# Patient Record
Sex: MALE | Race: OTHER | HISPANIC OR LATINO | URBAN - METROPOLITAN AREA
[De-identification: names, ages, dates, MRNs, and addresses within clinical notes are randomized per-mention and may not be internally consistent; named-entity substitution may affect disease eponyms.]

---

## 2020-11-09 ENCOUNTER — EMERGENCY (EMERGENCY)
Facility: HOSPITAL | Age: 40
LOS: 1 days | Discharge: HOME | End: 2020-11-09
Attending: EMERGENCY MEDICINE
Payer: MEDICARE

## 2020-11-09 VITALS
HEIGHT: 67 IN | RESPIRATION RATE: 18 BRPM | TEMPERATURE: 99 F | DIASTOLIC BLOOD PRESSURE: 63 MMHG | HEART RATE: 123 BPM | SYSTOLIC BLOOD PRESSURE: 107 MMHG | WEIGHT: 220.02 LBS | OXYGEN SATURATION: 91 %

## 2020-11-09 LAB
ALBUMIN SERPL ELPH-MCNC: 4.2 G/DL — SIGNIFICANT CHANGE UP (ref 3.3–5)
ALP SERPL-CCNC: 65 U/L — SIGNIFICANT CHANGE UP (ref 40–120)
ALT FLD-CCNC: 34 U/L — SIGNIFICANT CHANGE UP (ref 10–45)
ANION GAP SERPL CALC-SCNC: 14 MMOL/L — SIGNIFICANT CHANGE UP (ref 5–17)
APTT BLD: 32.8 SEC — SIGNIFICANT CHANGE UP (ref 27.5–35.5)
AST SERPL-CCNC: 30 U/L — SIGNIFICANT CHANGE UP (ref 10–40)
BASOPHILS # BLD AUTO: 0.05 K/UL — SIGNIFICANT CHANGE UP (ref 0–0.2)
BASOPHILS NFR BLD AUTO: 0.8 % — SIGNIFICANT CHANGE UP (ref 0–2)
BILIRUB SERPL-MCNC: 0.2 MG/DL — SIGNIFICANT CHANGE UP (ref 0.2–1.2)
BUN SERPL-MCNC: 7 MG/DL — SIGNIFICANT CHANGE UP (ref 7–23)
CALCIUM SERPL-MCNC: 8.7 MG/DL — SIGNIFICANT CHANGE UP (ref 8.4–10.5)
CHLORIDE SERPL-SCNC: 106 MMOL/L — SIGNIFICANT CHANGE UP (ref 96–108)
CO2 SERPL-SCNC: 27 MMOL/L — SIGNIFICANT CHANGE UP (ref 22–31)
CREAT SERPL-MCNC: 0.75 MG/DL — SIGNIFICANT CHANGE UP (ref 0.5–1.3)
EOSINOPHIL # BLD AUTO: 0.09 K/UL — SIGNIFICANT CHANGE UP (ref 0–0.5)
EOSINOPHIL NFR BLD AUTO: 1.5 % — SIGNIFICANT CHANGE UP (ref 0–6)
ETHANOL SERPL-MCNC: 368 MG/DL — HIGH (ref 0–10)
GLUCOSE SERPL-MCNC: 107 MG/DL — HIGH (ref 70–99)
HCT VFR BLD CALC: 40.5 % — SIGNIFICANT CHANGE UP (ref 39–50)
HGB BLD-MCNC: 14.4 G/DL — SIGNIFICANT CHANGE UP (ref 13–17)
IMM GRANULOCYTES NFR BLD AUTO: 0.2 % — SIGNIFICANT CHANGE UP (ref 0–1.5)
INR BLD: 0.98 RATIO — SIGNIFICANT CHANGE UP (ref 0.88–1.16)
LACTATE BLDV-MCNC: 2.5 MMOL/L — HIGH (ref 0.7–2)
LIDOCAIN IGE QN: 40 U/L — SIGNIFICANT CHANGE UP (ref 7–60)
LYMPHOCYTES # BLD AUTO: 2.45 K/UL — SIGNIFICANT CHANGE UP (ref 1–3.3)
LYMPHOCYTES # BLD AUTO: 39.9 % — SIGNIFICANT CHANGE UP (ref 13–44)
MCHC RBC-ENTMCNC: 32.7 PG — SIGNIFICANT CHANGE UP (ref 27–34)
MCHC RBC-ENTMCNC: 35.6 GM/DL — SIGNIFICANT CHANGE UP (ref 32–36)
MCV RBC AUTO: 92 FL — SIGNIFICANT CHANGE UP (ref 80–100)
MONOCYTES # BLD AUTO: 0.68 K/UL — SIGNIFICANT CHANGE UP (ref 0–0.9)
MONOCYTES NFR BLD AUTO: 11.1 % — SIGNIFICANT CHANGE UP (ref 2–14)
NEUTROPHILS # BLD AUTO: 2.86 K/UL — SIGNIFICANT CHANGE UP (ref 1.8–7.4)
NEUTROPHILS NFR BLD AUTO: 46.5 % — SIGNIFICANT CHANGE UP (ref 43–77)
NRBC # BLD: 0 /100 WBCS — SIGNIFICANT CHANGE UP (ref 0–0)
PLATELET # BLD AUTO: 338 K/UL — SIGNIFICANT CHANGE UP (ref 150–400)
POTASSIUM SERPL-MCNC: 3.7 MMOL/L — SIGNIFICANT CHANGE UP (ref 3.5–5.3)
POTASSIUM SERPL-SCNC: 3.7 MMOL/L — SIGNIFICANT CHANGE UP (ref 3.5–5.3)
PROT SERPL-MCNC: 7.8 G/DL — SIGNIFICANT CHANGE UP (ref 6–8.3)
PROTHROM AB SERPL-ACNC: 11.8 SEC — SIGNIFICANT CHANGE UP (ref 10.6–13.6)
RAPID RVP RESULT: SIGNIFICANT CHANGE UP
RBC # BLD: 4.4 M/UL — SIGNIFICANT CHANGE UP (ref 4.2–5.8)
RBC # FLD: 13.8 % — SIGNIFICANT CHANGE UP (ref 10.3–14.5)
SARS-COV-2 RNA SPEC QL NAA+PROBE: SIGNIFICANT CHANGE UP
SODIUM SERPL-SCNC: 147 MMOL/L — HIGH (ref 135–145)
WBC # BLD: 6.14 K/UL — SIGNIFICANT CHANGE UP (ref 3.8–10.5)
WBC # FLD AUTO: 6.14 K/UL — SIGNIFICANT CHANGE UP (ref 3.8–10.5)

## 2020-11-09 PROCEDURE — 99285 EMERGENCY DEPT VISIT HI MDM: CPT | Mod: 25

## 2020-11-09 PROCEDURE — 96374 THER/PROPH/DIAG INJ IV PUSH: CPT | Mod: XU

## 2020-11-09 PROCEDURE — 80053 COMPREHEN METABOLIC PANEL: CPT

## 2020-11-09 PROCEDURE — 71045 X-RAY EXAM CHEST 1 VIEW: CPT

## 2020-11-09 PROCEDURE — 83690 ASSAY OF LIPASE: CPT

## 2020-11-09 PROCEDURE — 80307 DRUG TEST PRSMV CHEM ANLYZR: CPT

## 2020-11-09 PROCEDURE — 0225U NFCT DS DNA&RNA 21 SARSCOV2: CPT

## 2020-11-09 PROCEDURE — 70450 CT HEAD/BRAIN W/O DYE: CPT | Mod: 26

## 2020-11-09 PROCEDURE — 85730 THROMBOPLASTIN TIME PARTIAL: CPT

## 2020-11-09 PROCEDURE — 83605 ASSAY OF LACTIC ACID: CPT

## 2020-11-09 PROCEDURE — 70450 CT HEAD/BRAIN W/O DYE: CPT

## 2020-11-09 PROCEDURE — 72170 X-RAY EXAM OF PELVIS: CPT | Mod: 26

## 2020-11-09 PROCEDURE — 96375 TX/PRO/DX INJ NEW DRUG ADDON: CPT | Mod: XU

## 2020-11-09 PROCEDURE — 82962 GLUCOSE BLOOD TEST: CPT

## 2020-11-09 PROCEDURE — 85025 COMPLETE CBC W/AUTO DIFF WBC: CPT

## 2020-11-09 PROCEDURE — 74177 CT ABD & PELVIS W/CONTRAST: CPT

## 2020-11-09 PROCEDURE — 74177 CT ABD & PELVIS W/CONTRAST: CPT | Mod: 26

## 2020-11-09 PROCEDURE — 71260 CT THORAX DX C+: CPT | Mod: 26

## 2020-11-09 PROCEDURE — 71045 X-RAY EXAM CHEST 1 VIEW: CPT | Mod: 26

## 2020-11-09 PROCEDURE — 99291 CRITICAL CARE FIRST HOUR: CPT

## 2020-11-09 PROCEDURE — 72125 CT NECK SPINE W/O DYE: CPT

## 2020-11-09 PROCEDURE — 72125 CT NECK SPINE W/O DYE: CPT | Mod: 26

## 2020-11-09 PROCEDURE — 71260 CT THORAX DX C+: CPT

## 2020-11-09 PROCEDURE — 72170 X-RAY EXAM OF PELVIS: CPT

## 2020-11-09 PROCEDURE — 85610 PROTHROMBIN TIME: CPT

## 2020-11-09 RX ORDER — SODIUM CHLORIDE 9 MG/ML
1000 INJECTION INTRAMUSCULAR; INTRAVENOUS; SUBCUTANEOUS ONCE
Refills: 0 | Status: COMPLETED | OUTPATIENT
Start: 2020-11-09 | End: 2020-11-09

## 2020-11-09 RX ORDER — SODIUM CHLORIDE 9 MG/ML
2000 INJECTION, SOLUTION INTRAVENOUS ONCE
Refills: 0 | Status: COMPLETED | OUTPATIENT
Start: 2020-11-09 | End: 2020-11-09

## 2020-11-09 RX ORDER — HALOPERIDOL DECANOATE 100 MG/ML
2.5 INJECTION INTRAMUSCULAR ONCE
Refills: 0 | Status: COMPLETED | OUTPATIENT
Start: 2020-11-09 | End: 2020-11-09

## 2020-11-09 RX ADMIN — SODIUM CHLORIDE 1000 MILLILITER(S): 9 INJECTION INTRAMUSCULAR; INTRAVENOUS; SUBCUTANEOUS at 19:53

## 2020-11-09 RX ADMIN — SODIUM CHLORIDE 2000 MILLILITER(S): 9 INJECTION, SOLUTION INTRAVENOUS at 23:03

## 2020-11-09 RX ADMIN — HALOPERIDOL DECANOATE 2.5 MILLIGRAM(S): 100 INJECTION INTRAMUSCULAR at 16:44

## 2020-11-09 RX ADMIN — Medication 2 MILLIGRAM(S): at 16:07

## 2020-11-09 NOTE — ED ADULT TRIAGE NOTE - CHIEF COMPLAINT QUOTE
ETOH- [place] [facility] [place] abrasion to right side of head  diaphoretic, hypoxic, tachycardic on arrival

## 2020-11-09 NOTE — ED PROVIDER NOTE - NSFOLLOWUPINSTRUCTIONS_ED_ALL_ED_FT
Rest, drink plenty of fluids  Advance activity as tolerated  Continue all previously prescribed medications as directed  Follow up with your PMD - bring copies of your results  Return to the ER for chest pain, difficulty breathing, numbness, weakness, or other new or concerning symptoms   For pain, you may take ibuprofen 600mg every 6 hours as needed

## 2020-11-09 NOTE — ED ADULT NURSE NOTE - ED STAT RN HANDOFF DETAILS
Bedside report given to on coming nurse Jaylin Understands patient condition , medications given and plan of care for patient. Patient in stable condition, vital signs updated, has no complaints at this time and has been updated on care plan. Explained to patient that it is change of shift and new nurse is taking over, pt verbalized understanding.

## 2020-11-09 NOTE — ED PROVIDER NOTE - PATIENT PORTAL LINK FT
You can access the FollowMyHealth Patient Portal offered by [facility] by registering at the following website: [email or web address]. By joining [employer]’s FollowMyHealth portal, you will also be able to view your health information using other applications (apps) compatible with our system.

## 2020-11-09 NOTE — ED ADULT NURSE NOTE - OBJECTIVE STATEMENT
39y/o male arrives to the ER via EMS  complaining of [place], kimmie BHATIA . Pt is A&Ox4, speaking coherently. PMH ....Pt states. ....On assessment airway is patent, breathing spontaneusly and unlabored, skin is dry, warm and color appropiate to race. adomen is soft, non distended, non tender, ROM in all extremities. Pt denies SOB, chest pain, NVD, fevers, chills. Comfort measures provided. call bell within reach, bed locked in the lowest position. will continure to reasess . 41y/o male arrives to the ER via EMS after [place], possible fall. Pt is ETHOT, hypoxic, tachycardic,  . Pt is A&Ox2, speaking coherently, but uncooperative. pt was trying to pull the IV lines, nasal cannula.  abrasion on the right side of the cheek noted. On assessment airway is patent, breathing spontaneously and unlabored, skin is dry, warm and color appropriate to race. abdomen is soft, non distended, non tender, ROM in all extremities. Pt denies SOB, chest pain, NVD, fevers, chills. Comfort measures provided. call bell within reach, bed locked in the lowest position. will continue to reassess . 41y/o male arrives to the ER via EMS after [place], possible fall. Pt is ETHOT, hypoxic, tachycardic,  . Pt is A&Ox2, speaking coherently, but uncooperative. pt was trying to pull the IV lines, nasal cannula.  abrasion on the right side of the cheek noted. Pt placed on continuous pulse ox and cardiac monitor, sinus tach noted. Upon assessment, pt was at uncooperative, anxious, and risk of harm himself, therefore constant observation initiated, via one to one at bedside for patient safety  On assessment airway is patent, breathing spontaneously and unlabored, skin is dry, warm and color appropriate to race. abdomen is soft, non distended, non tender, ROM in all extremities. Pt denies SOB, chest pain, NVD, fevers, chills. Comfort measures provided. call bell within reach, bed locked in the lowest position. will continue to reassess . 41y/o male arrives to the ER via EMS after being [place] the train station intoxicated. Pt is A&Ox2, speaking coherently, but uncooperative with staff. According to EMS pt was hypoxic to 90% RA, refusing to keep NC in nose or mask over face. Pt has an abrasion on the right side of the cheek noted, no other s/s trauma noted. no obvious deformities noted. Pt placed on continuous pulse ox and cardiac monitor, sinus tach noted. pt was at uncooperative, anxious, and risk of elopement, therefore constant observation initiated, via one to one at bedside for patient safety. On assessment airway is patent, breathing spontaneously and unlabored, skin is dry, warm and color appropriate to race. abdomen is soft, non distended, non tender, ROM in all extremities. Pt denies SOB, chest pain, NVD, fevers, chills. Comfort measures provided. call bell within reach, bed locked in the lowest position. will continue to reassess.

## 2020-11-09 NOTE — ED PROVIDER NOTE - PROGRESS NOTE DETAILS
Joseph Frankel PGY2: Scans negative for trauma aside from chronic injuries. CT chest with bl atelectasis which may be causing his hypoxia and compensatory tachycardia. Patient signed out to MD Brown with gusman to reassess patient and vitals once clinically sober. pt has been stable. take off 1:1 and cont to observe. Kevin: Patient reassessed.  Clinically sober, ambulatory.  Denies pain, states feels well otherwise.  Discussed results with patient and instructions to follow up with primary care physician.  Will dc with return precautions.

## 2020-11-09 NOTE — ED PROVIDER NOTE - PHYSICAL EXAMINATION
Gen: Combative and agitated. Not answering questions appropriately  HEENT: + abrasion on right side of forehead, extra occular movements intact, no nasal discharge, mucous membranes moist  CV: Tachy, reg rhythm, +S1/S2, no murmurs/rubs/gallops,   Resp: Clear to ausculation bilaterally, no wheezes/rhonchi/rales  GI: Abdomen soft non-distended, non tender to palpation, reducible ventral hernia  MSK: no LE edema, Homans sign negative bl  Neuro: following commands intermittently, moving all four extremities spontaneously  Psych: Agitated and non cooperative.

## 2020-11-09 NOTE — ED PROVIDER NOTE - OBJECTIVE STATEMENT
39 yo M with unknown medical history presenting by EMS for intoxication and altered after being found down at [place] [facility]. Is agitated and refusing 41 yo M with unknown medical history presenting by EMS for intoxication and altered after being found down at [place]. Is agitated and refusing examination. Only endorsing some abdominal pain, but denies to answer other questions.

## 2020-11-09 NOTE — ED PROVIDER NOTE - CARE PLAN
Principal Discharge DX:	Alcoholic intoxication with complication  Secondary Diagnosis:	Agitation requiring sedation protocol  Secondary Diagnosis:	Facial abrasion, initial encounter   hand grasp, leg strength strong and equal bilaterally

## 2020-11-09 NOTE — ED ADULT NURSE NOTE - CHIEF COMPLAINT QUOTE
ETOH- [place] [facility] [place]. abrasion to right side of head  diaphoretic, hypoxic, tachycardic on arrival

## 2020-11-09 NOTE — ED PROVIDER NOTE - CLINICAL SUMMARY MEDICAL DECISION MAKING FREE TEXT BOX
Joseph Frankel PGY2: 39 yo with unknown medical history presenting after being found, supposedly intoxicated. Tachy and hypoxic. Otherwise VSS. Patient looks well and is non toxic appearing. PE as above. As patient refusing to comply with exam and evidence of trauma will sedate patient and get pan scan. Patient responding well to NC. Will also check labs. Will reassess.

## 2020-11-09 NOTE — ED PROVIDER NOTE - ATTENDING CONTRIBUTION TO CARE
unknown person found down. smelled of etoh  R facial abr / no spinal ttp / no chest wall ttp / non-tender abdominal / moving 4/4  / agitated - having trouble staying still in bed  needs sedation to facilitate medical w/u. pt clinically intox. lab / ct-pan scan / re-assess

## 2020-11-09 NOTE — ED ADULT NURSE REASSESSMENT NOTE - NS ED NURSE REASSESS COMMENT FT1
pt uncooperative with staff. refusing to sit in stretcher to get CT scan completed. MD Frankel states to give 2mg ativan IVP. Pt placed on supplemental O2, placed on CM. EKG completed.

## 2020-11-09 NOTE — ED ADULT NURSE NOTE - CHPI ED NUR SYMPTOMS NEG
no decreased eating/drinking/no tingling/no dizziness/no fever/no pain/no vomiting/no chills/no weakness/no nausea

## 2020-11-10 VITALS
RESPIRATION RATE: 18 BRPM | DIASTOLIC BLOOD PRESSURE: 85 MMHG | HEART RATE: 99 BPM | OXYGEN SATURATION: 100 % | SYSTOLIC BLOOD PRESSURE: 135 MMHG

## 2024-07-17 NOTE — ED ADULT TRIAGE NOTE - HEIGHT IN FEET
MSN, CM:  patient and family requested [facility] which was referred and accepted.  Auth pending.  Patient to be discharged to [facility] when medically stable and auth approved.  Case Management will continue to follow.   5
